# Patient Record
Sex: FEMALE | Race: WHITE | NOT HISPANIC OR LATINO
[De-identification: names, ages, dates, MRNs, and addresses within clinical notes are randomized per-mention and may not be internally consistent; named-entity substitution may affect disease eponyms.]

---

## 2019-04-05 ENCOUNTER — HOSPITAL (OUTPATIENT)
Dept: OTHER | Age: 44
End: 2019-04-05

## 2019-05-09 ENCOUNTER — HOSPITAL (OUTPATIENT)
Dept: OTHER | Age: 44
End: 2019-05-09

## 2019-11-07 ENCOUNTER — TELEPHONE (OUTPATIENT)
Dept: SCHEDULING | Age: 44
End: 2019-11-07

## 2019-11-09 ENCOUNTER — WALK IN (OUTPATIENT)
Dept: URGENT CARE | Age: 44
End: 2019-11-09

## 2019-11-09 DIAGNOSIS — Z23 FLU VACCINE NEED: Primary | ICD-10-CM

## 2019-11-09 PROCEDURE — 90686 IIV4 VACC NO PRSV 0.5 ML IM: CPT | Performed by: NURSE PRACTITIONER

## 2019-11-09 PROCEDURE — 90471 IMMUNIZATION ADMIN: CPT | Performed by: NURSE PRACTITIONER

## 2019-11-09 RX ORDER — TERIFLUNOMIDE 14 MG/1
TABLET, FILM COATED ORAL
COMMUNITY

## 2021-03-18 PROBLEM — R05.9 COUGH: Status: ACTIVE | Noted: 2021-03-18

## 2021-03-18 PROBLEM — R45.89 ANXIETY ABOUT HEALTH: Status: ACTIVE | Noted: 2021-03-18

## 2021-03-18 PROBLEM — Z12.31 ENCOUNTER FOR SCREENING MAMMOGRAM FOR BREAST CANCER: Status: ACTIVE | Noted: 2021-03-18

## 2021-03-18 PROBLEM — G35 MULTIPLE SCLEROSIS (HCC): Status: ACTIVE | Noted: 2021-03-18

## 2023-01-08 ENCOUNTER — APPOINTMENT (OUTPATIENT)
Dept: URGENT CARE | Age: 48
End: 2023-01-08

## 2024-08-30 ENCOUNTER — APPOINTMENT (OUTPATIENT)
Dept: ULTRASOUND IMAGING | Age: 49
End: 2024-08-30
Attending: EMERGENCY MEDICINE
Payer: COMMERCIAL

## 2024-08-30 ENCOUNTER — HOSPITAL ENCOUNTER (EMERGENCY)
Age: 49
Discharge: HOME | End: 2024-08-30
Attending: EMERGENCY MEDICINE
Payer: COMMERCIAL

## 2024-08-30 VITALS
DIASTOLIC BLOOD PRESSURE: 81 MMHG | TEMPERATURE: 98 F | WEIGHT: 150 LBS | BODY MASS INDEX: 24.99 KG/M2 | HEART RATE: 58 BPM | HEIGHT: 65 IN | OXYGEN SATURATION: 100 % | SYSTOLIC BLOOD PRESSURE: 107 MMHG | RESPIRATION RATE: 14 BRPM

## 2024-08-30 DIAGNOSIS — I82.612 CEPHALIC VEIN THROMBOSIS, LEFT: Primary | ICD-10-CM

## 2024-08-30 PROCEDURE — 99283 EMERGENCY DEPT VISIT LOW MDM: CPT

## 2024-08-30 PROCEDURE — 99284 EMERGENCY DEPT VISIT MOD MDM: CPT

## 2024-08-30 PROCEDURE — 93971 EXTREMITY STUDY: CPT | Performed by: EMERGENCY MEDICINE

## 2024-08-30 NOTE — ED PROVIDER NOTES
Patient Seen in: [facility] In [place]      History     Chief Complaint   Patient presents with    Swelling Edema     Stated Complaint: left arm swelling after MS infusion ocrevis 2 days ago    Subjective:   HPI    49 YO female presents to emergency department for evaluation of left dorsal hand pain and swelling that radiates to the left forearm starting yesterday.  Noticed a faint red streak at the left forearm yesterday.  Patient received MS infusion 2 days ago. Denies fever.         Objective:   Past Medical History:    MS (multiple sclerosis) (HCC)    Nephrolithiasis    PH with ureteral stent, Dr. Aceves urology              Past Surgical History:   Procedure Laterality Date    Other accessory      D & C                Social History     Socioeconomic History    Marital status:    Tobacco Use    Smoking status: Never    Smokeless tobacco: Never   Vaping Use    Vaping status: Never Used   Substance and Sexual Activity    Alcohol use: Yes    Drug use: No     Social Determinants of Health      Received from [facility]              Review of Systems    Positive for stated Chief Complaint: Swelling Edema    Other systems are as noted in HPI.  Constitutional and vital signs reviewed.      All other systems reviewed and negative except as noted above.    Physical Exam     ED Triage Vitals [08/30/24 0901]   /83   Pulse 77   Resp 14   Temp 98.2 °F (36.8 °C)   Temp src Temporal   SpO2 100 %   O2 Device None (Room air)       Current Vitals:   Vital Signs  BP: 107/81  Pulse: 58  Resp: 14  Temp: 98.2 °F (36.8 °C)  Temp src: Temporal    Oxygen Therapy  SpO2: 100 %  O2 Device: None (Room air)          Physical Exam  Vitals and nursing note reviewed.   Constitutional:       General: She is not in acute distress.     Appearance: Normal appearance. She is not ill-appearing, toxic-appearing or diaphoretic.   Cardiovascular:      Rate and Rhythm: Normal rate and regular rhythm.    Pulmonary:      Effort: Pulmonary effort is normal. No respiratory distress.      Breath sounds: Normal breath sounds.   Musculoskeletal:      Left forearm: Swelling (mild) and tenderness present.      Left wrist: Normal pulse.      Left hand: Swelling and tenderness present. No bony tenderness. Normal pulse.      Comments: Very mild tenderness at the left dorsal hand and left forearm.  Mild erythema at the left dorsal hand with a faint red streak to the left radial forearm.   Neurological:      Mental Status: She is alert and oriented to person, place, and time.   Psychiatric:         Mood and Affect: Mood normal.         Behavior: Behavior normal.         ED Course   Labs Reviewed - No data to display  US VENOUS DOPPLER ARM LEFT - DIAG IMG (CPT=93971)    Result Date: 8/30/2024  PROCEDURE:  US VENOUS DOPPLER ARM LEFT - DIAG IMG (CPT=93971)  COMPARISON:  None.  INDICATIONS:  eval for DVT  TECHNIQUE:  Real time, grey scale, and duplex ultrasound was used to evaluate the upper extremity venous system. B-mode two-dimensional images of the vascular structures, Doppler spectral analysis, and color flow.  Doppler imaging were performed.  The following veins were imaged:  Subclavian, Jugular, Axillary, Brachial, Basilic, Cephalic, and the contralateral Subclavian and Jugular.  PATIENT STATED HISTORY: (As transcribed by Technologist)  Left arm swelling for couple of days.    FINDINGS:  EXTREMITY:  LEFT UPPER EXTREMITY THROMBI:  There is occlusive thrombus within the left cephalic vein. COMPRESSION:  Normal compressibility, phasicity, and augmentation of the subclavian, jugular, axillary, brachial veins. OTHER:  Negative.            CONCLUSION:  1. No acute deep vein thrombosis. 2. Occlusive thrombus within the left cephalic vein.    LOCATION:  [facility]   Dictated by (CST): Ba Rivera MD on 8/30/2024 at 10:46 AM     Finalized by (CST): Ba Rivera MD on 8/30/2024 at 10:53 AM          MDM      Differential  diagnosis considered but not limited to cellulitis, lymphangitis, less likely DVT    Physical exam as above.  Ultrasound of left arm is negative for DVT.  Occlusive thrombus within the left cephalic vein.  NSAIDs advised.  No clinical indication for antibiotics at this time.  Return instructions discussed with patient understanding.      Medical Decision Making  Amount and/or Complexity of Data Reviewed  Radiology: ordered. Decision-making details documented in ED Course.    Risk  OTC drugs.        Disposition and Plan     Clinical Impression:  1. Cephalic vein thrombosis, left         Disposition:  Discharge  8/30/2024 11:06 am    Follow-up:  Gen Morin MD  [id number] [facility] [id number] [phone]    Follow up  As needed    [facility] in [place]  [address] [place] [id number]  [phone]  Follow up  If symptoms worsen          Medications Prescribed:  Discharge Medication List as of 8/30/2024 11:09 AM

## 2024-08-30 NOTE — ED INITIAL ASSESSMENT (HPI)
Had an IV in back of l hand for MS infusion on weds- swelling to l hand began yesterday- increased swelling today with pain

## 2024-08-30 NOTE — Clinical Note
Take ibuprofen 600 to 800 mg every 8 hours as needed for pain.    Return to the emergency department for any new or worsening symptoms.
